# Patient Record
Sex: FEMALE | Race: BLACK OR AFRICAN AMERICAN | NOT HISPANIC OR LATINO | URBAN - METROPOLITAN AREA
[De-identification: names, ages, dates, MRNs, and addresses within clinical notes are randomized per-mention and may not be internally consistent; named-entity substitution may affect disease eponyms.]

---

## 2017-06-16 ENCOUNTER — OFFICE (OUTPATIENT)
Dept: URBAN - METROPOLITAN AREA CLINIC 19 | Facility: CLINIC | Age: 32
End: 2017-06-16

## 2017-06-16 VITALS
HEIGHT: 63 IN | SYSTOLIC BLOOD PRESSURE: 126 MMHG | WEIGHT: 236 LBS | DIASTOLIC BLOOD PRESSURE: 82 MMHG | HEART RATE: 75 BPM

## 2017-06-16 DIAGNOSIS — K62.5 HEMORRHAGE OF ANUS AND RECTUM: ICD-10-CM

## 2017-06-16 DIAGNOSIS — R10.32 LEFT LOWER QUADRANT PAIN: ICD-10-CM

## 2017-06-16 PROCEDURE — 99204 OFFICE O/P NEW MOD 45 MIN: CPT | Performed by: INTERNAL MEDICINE

## 2017-06-16 NOTE — SERVICENOTES
We discuss the fact that this pain may be also non-GI causes but I'll do my best to evaluate for such also.  She has no other physicians who she sees.  Pending above workup we will discuss at a flexible sigmoidoscopy, but wanted to proceed with imaging first to rule out any diverticular disease

## 2017-06-16 NOTE — SERVICEHPINOTES
Ms. Kaur  is a   31  year old   female   here for evaluation of a few months of LLQ pain. Every 2-3 days. Comes on abruptly, lasts from 1 hour to a whole day. Sometimes pain radiates down left side. Has not changed sleeping behavior, mattress, etc. No new activity or exercise programs.  Some nausea, no vomiting. No fevers. No obvious trigger. No alleviating factor (including BM). Sometimes blood on tissue when she wipes, none in the toilet. No FHx of colon cancer or IBS, but niece has IBS.  Denies any hematuria, dysuria, vaginal discharge.Last year had gynecology evaluation (prior to the pain) which included a normal USG.Last menstrual period was June 6th. Cycles are regular

## 2017-06-18 LAB
BASIC METABOLIC PANEL: CALCIUM TOTAL: 9.3 MG/DL (ref 8.5–10.5)
BASIC METABOLIC PANEL: CARBON DIOXIDE: 28 MEQ/L (ref 21–31)
BASIC METABOLIC PANEL: CHLORIDE: 101 MEQ/L (ref 96–106)
BASIC METABOLIC PANEL: CREATININE: 0.79 MG/DL (ref 0.6–1.3)
BASIC METABOLIC PANEL: GLUCOSE: 102 MG/DL — HIGH (ref 65–100)
BASIC METABOLIC PANEL: POTASSIUM: 3.8 MEQ/ML (ref 3.5–5.4)
BASIC METABOLIC PANEL: SODIUM: 144 MEQ/L (ref 135–145)
BASIC METABOLIC PANEL: UREA NITROGEN: 12 MG/DL (ref 6–20)
C-REACTIVE PROTEIN: 2.1 MG/DL — HIGH
CBC COMPLETE BLOOD COUNT W/O DIFF: HEMATOCRIT: 38.7 % (ref 36–48)
CBC COMPLETE BLOOD COUNT W/O DIFF: HEMOGLOBIN: 13.1 G/DL (ref 12–16)
CBC COMPLETE BLOOD COUNT W/O DIFF: MCH: 28 PG (ref 25–35)
CBC COMPLETE BLOOD COUNT W/O DIFF: MCHC: 33.9 % (ref 30–38)
CBC COMPLETE BLOOD COUNT W/O DIFF: MCV: 82.7 FL (ref 78–102)
CBC COMPLETE BLOOD COUNT W/O DIFF: PLATELET COUNT: 389 K/UL (ref 150–450)
CBC COMPLETE BLOOD COUNT W/O DIFF: RBC DISTRIBUTION WIDTH: 14.2 % (ref 11.5–16)
CBC COMPLETE BLOOD COUNT W/O DIFF: RED BLOOD CELL COUNT: 4.68 M/UL (ref 4–5.5)
CBC COMPLETE BLOOD COUNT W/O DIFF: WHITE BLOOD CELL COUNT: 9.3 K/UL (ref 4–11)
Lab: (no result) /HPF
URINALYSIS,COMP W/RFX TO CULTURE: BACTERIA: 0
URINALYSIS,COMP W/RFX TO CULTURE: BILIRUBIN: NEGATIVE
URINALYSIS,COMP W/RFX TO CULTURE: BLOOD: NEGATIVE
URINALYSIS,COMP W/RFX TO CULTURE: GLUCOSE: NEGATIVE
URINALYSIS,COMP W/RFX TO CULTURE: KETONES, URINE: NEGATIVE
URINALYSIS,COMP W/RFX TO CULTURE: LEUKOCYTE ESTERASE: NEGATIVE
URINALYSIS,COMP W/RFX TO CULTURE: MUCOUS: (no result)
URINALYSIS,COMP W/RFX TO CULTURE: NITRITES: NEGATIVE
URINALYSIS,COMP W/RFX TO CULTURE: PH,URINE: 6 (ref 4.6–8.5)
URINALYSIS,COMP W/RFX TO CULTURE: PROTEIN: NEGATIVE
URINALYSIS,COMP W/RFX TO CULTURE: RBC: 10 CELLS/HPF — HIGH (ref 0–4)
URINALYSIS,COMP W/RFX TO CULTURE: SPECIFIC GRAVITY: 1.03 (ref 1–1.03)
URINALYSIS,COMP W/RFX TO CULTURE: SQUAMOUS EPITHELIAL CELL: 6 /HPF
URINALYSIS,COMP W/RFX TO CULTURE: URINE CLARITY: (no result)
URINALYSIS,COMP W/RFX TO CULTURE: URINE COLOR: YELLOW
URINALYSIS,COMP W/RFX TO CULTURE: WBC: 0 CELLS/HPF (ref 0–2)
URINE CULTURE: C URINE: (no result)

## 2017-06-26 ENCOUNTER — OFFICE (OUTPATIENT)
Dept: URBAN - METROPOLITAN AREA CLINIC 22 | Facility: CLINIC | Age: 32
End: 2017-06-26

## 2017-06-26 DIAGNOSIS — R10.32 LEFT LOWER QUADRANT PAIN: ICD-10-CM

## 2017-06-26 DIAGNOSIS — K62.5 HEMORRHAGE OF ANUS AND RECTUM: ICD-10-CM

## 2017-06-26 PROCEDURE — 74177 CT ABD & PELVIS W/CONTRAST: CPT | Performed by: INTERNAL MEDICINE

## 2017-10-31 ENCOUNTER — OFFICE (OUTPATIENT)
Dept: URBAN - METROPOLITAN AREA CLINIC 19 | Facility: CLINIC | Age: 32
End: 2017-10-31

## 2017-10-31 VITALS
SYSTOLIC BLOOD PRESSURE: 115 MMHG | WEIGHT: 234 LBS | HEART RATE: 67 BPM | HEIGHT: 63 IN | DIASTOLIC BLOOD PRESSURE: 71 MMHG

## 2017-10-31 DIAGNOSIS — R59.0 LOCALIZED ENLARGED LYMPH NODES: ICD-10-CM

## 2017-10-31 DIAGNOSIS — R79.82 ELEVATED C-REACTIVE PROTEIN (CRP): ICD-10-CM

## 2017-10-31 DIAGNOSIS — R10.32 LEFT LOWER QUADRANT PAIN: ICD-10-CM

## 2017-10-31 LAB
ANTINUCLEAR ANTIBODIES, IFA: POSITIVE
C-REACTIVE PROTEIN, QUANT: 16.8 MG/L — HIGH (ref 0–4.9)
FANA STAINING PATTERNS: HOMOGENEOUS PATTERN: HIGH
FANA STAINING PATTERNS: NOTE: (no result)
PANEL 083935: HIV SCREEN 4TH GENERATION WRFX: NON REACTIVE
PROTEIN ELECTRO.,S: A/G RATIO: 0.9 (ref 0.7–1.7)
PROTEIN ELECTRO.,S: ALBUMIN: 3.6 G/DL (ref 2.9–4.4)
PROTEIN ELECTRO.,S: ALPHA-1-GLOBULIN: 0.3 G/DL (ref 0–0.4)
PROTEIN ELECTRO.,S: ALPHA-2-GLOBULIN: 0.9 G/DL (ref 0.4–1)
PROTEIN ELECTRO.,S: BETA GLOBULIN: 1.1 G/DL (ref 0.7–1.3)
PROTEIN ELECTRO.,S: GAMMA GLOBULIN: 1.5 G/DL (ref 0.4–1.8)
PROTEIN ELECTRO.,S: GLOBULIN, TOTAL: 3.8 G/DL (ref 2.2–3.9)
PROTEIN ELECTRO.,S: M-SPIKE: (no result) G/DL
PROTEIN ELECTRO.,S: PLEASE NOTE: (no result)
PROTEIN ELECTRO.,S: PROTEIN, TOTAL, SERUM: 7.4 G/DL (ref 6–8.5)
QUANTIFERON IN TUBE: INTERPRETATION: (no result)
QUANTIFERON IN TUBE: QFT TB AG MINUS NIL VALUE: <0 IU/ML
QUANTIFERON IN TUBE: QUANTIFERON CRITERIA: (no result)
QUANTIFERON IN TUBE: QUANTIFERON MITOGEN VALUE: >10 IU/ML
QUANTIFERON IN TUBE: QUANTIFERON NIL VALUE: 0.03 IU/ML
QUANTIFERON IN TUBE: QUANTIFERON TB AG VALUE: 0.02 IU/ML
QUANTIFERON IN TUBE: QUANTIFERON TB GOLD: NEGATIVE
QUANTIFERON TB GOLD (IN TUBE): QUANTIFERON INCUBATION: (no result)
RAPID PLASMA REAGIN, QUANT: NON REACTIVE

## 2017-10-31 PROCEDURE — 99214 OFFICE O/P EST MOD 30 MIN: CPT | Performed by: INTERNAL MEDICINE

## 2017-10-31 RX ORDER — ACETAMINOPHEN AND CODEINE PHOSPHATE 300; 60 MG/1; MG/1
900 TABLET ORAL
Qty: 20 | Refills: 0 | Status: ACTIVE
Start: 2017-10-31

## 2017-12-29 ENCOUNTER — AMBULATORY SURGICAL CENTER (OUTPATIENT)
Dept: URBAN - METROPOLITAN AREA SURGERY 2 | Facility: SURGERY | Age: 32
End: 2017-12-29

## 2017-12-29 ENCOUNTER — OFFICE (OUTPATIENT)
Dept: URBAN - METROPOLITAN AREA PATHOLOGY 22 | Facility: PATHOLOGY | Age: 32
End: 2017-12-29

## 2017-12-29 VITALS
HEIGHT: 63 IN | HEART RATE: 95 BPM | OXYGEN SATURATION: 98 % | SYSTOLIC BLOOD PRESSURE: 109 MMHG | TEMPERATURE: 98.6 F | DIASTOLIC BLOOD PRESSURE: 52 MMHG | DIASTOLIC BLOOD PRESSURE: 52 MMHG | WEIGHT: 237 LBS | TEMPERATURE: 98.6 F | HEART RATE: 89 BPM | SYSTOLIC BLOOD PRESSURE: 98 MMHG | HEART RATE: 84 BPM | TEMPERATURE: 98.7 F | SYSTOLIC BLOOD PRESSURE: 86 MMHG | SYSTOLIC BLOOD PRESSURE: 109 MMHG | OXYGEN SATURATION: 97 % | DIASTOLIC BLOOD PRESSURE: 70 MMHG | DIASTOLIC BLOOD PRESSURE: 60 MMHG | HEART RATE: 84 BPM | OXYGEN SATURATION: 99 % | OXYGEN SATURATION: 97 % | DIASTOLIC BLOOD PRESSURE: 51 MMHG | HEART RATE: 95 BPM | RESPIRATION RATE: 18 BRPM | DIASTOLIC BLOOD PRESSURE: 51 MMHG | OXYGEN SATURATION: 99 % | SYSTOLIC BLOOD PRESSURE: 98 MMHG | TEMPERATURE: 98.7 F | HEIGHT: 63 IN | WEIGHT: 237 LBS | HEART RATE: 89 BPM | SYSTOLIC BLOOD PRESSURE: 97 MMHG | SYSTOLIC BLOOD PRESSURE: 97 MMHG | DIASTOLIC BLOOD PRESSURE: 70 MMHG | DIASTOLIC BLOOD PRESSURE: 60 MMHG | RESPIRATION RATE: 18 BRPM | SYSTOLIC BLOOD PRESSURE: 86 MMHG | OXYGEN SATURATION: 98 %

## 2017-12-29 DIAGNOSIS — R79.82 ELEVATED C-REACTIVE PROTEIN (CRP): ICD-10-CM

## 2017-12-29 DIAGNOSIS — K52.9 NONINFECTIVE GASTROENTERITIS AND COLITIS, UNSPECIFIED: ICD-10-CM

## 2017-12-29 DIAGNOSIS — R10.32 LEFT LOWER QUADRANT PAIN: ICD-10-CM

## 2017-12-29 PROCEDURE — 88305 TISSUE EXAM BY PATHOLOGIST: CPT | Performed by: INTERNAL MEDICINE

## 2017-12-29 PROCEDURE — 45331 SIGMOIDOSCOPY AND BIOPSY: CPT | Performed by: INTERNAL MEDICINE

## 2017-12-29 RX ORDER — DICYCLOMINE HYDROCHLORIDE 10 MG/1
CAPSULE ORAL
Qty: 60 | Refills: 3 | Status: ACTIVE
Start: 2017-12-29